# Patient Record
Sex: FEMALE | Race: WHITE | Employment: FULL TIME | URBAN - METROPOLITAN AREA
[De-identification: names, ages, dates, MRNs, and addresses within clinical notes are randomized per-mention and may not be internally consistent; named-entity substitution may affect disease eponyms.]

---

## 2017-07-01 ENCOUNTER — OFFICE VISIT (OUTPATIENT)
Dept: URGENT CARE | Facility: PHYSICIAN GROUP | Age: 22
End: 2017-07-01
Payer: OTHER MISCELLANEOUS

## 2017-07-01 VITALS
BODY MASS INDEX: 23.74 KG/M2 | HEART RATE: 73 BPM | OXYGEN SATURATION: 97 % | DIASTOLIC BLOOD PRESSURE: 60 MMHG | SYSTOLIC BLOOD PRESSURE: 94 MMHG | RESPIRATION RATE: 16 BRPM | TEMPERATURE: 98.8 F | WEIGHT: 134 LBS | HEIGHT: 63 IN

## 2017-07-01 DIAGNOSIS — J98.8 RTI (RESPIRATORY TRACT INFECTION): ICD-10-CM

## 2017-07-01 DIAGNOSIS — J02.0 STREP PHARYNGITIS: Primary | ICD-10-CM

## 2017-07-01 LAB
INT CON NEG: NEGATIVE
INT CON POS: POSITIVE
S PYO AG THROAT QL: NORMAL

## 2017-07-01 PROCEDURE — 99202 OFFICE O/P NEW SF 15 MIN: CPT | Performed by: FAMILY MEDICINE

## 2017-07-01 PROCEDURE — 87880 STREP A ASSAY W/OPTIC: CPT | Performed by: FAMILY MEDICINE

## 2017-07-01 RX ORDER — IBUPROFEN 800 MG/1
800 TABLET ORAL EVERY 8 HOURS PRN
Qty: 20 TAB | Refills: 3 | Status: SHIPPED | OUTPATIENT
Start: 2017-07-01

## 2017-07-01 RX ORDER — AMOXICILLIN 875 MG/1
875 TABLET, COATED ORAL EVERY 12 HOURS
Qty: 20 TAB | Refills: 0 | Status: SHIPPED | OUTPATIENT
Start: 2017-07-01 | End: 2017-07-11

## 2017-07-01 ASSESSMENT — ENCOUNTER SYMPTOMS
COUGH: 1
SPUTUM PRODUCTION: 0
HEADACHES: 1
SORE THROAT: 1
DIZZINESS: 1
CHILLS: 1
FEVER: 1

## 2017-07-01 NOTE — Clinical Note
July 1, 2017         Patient: Navi Reed   YOB: 1995   Date of Visit: 7/1/2017           To Whom it May Concern:    Navi Reed was seen in my clinic on 7/1/2017. She may return to work in 2-3 days.    If you have any questions or concerns, please don't hesitate to call.        Sincerely,           Wagner Crockett M.D.  Electronically Signed

## 2017-07-01 NOTE — PROGRESS NOTES
"Subjective:      Navi Reed is a 22 y.o. female who presents with Dizziness    Chief Complaint   Patient presents with   • Dizziness     cough, sore throat, associated SOB and chest tightness        - This is a very pleasant 22 y.o. female with complaints of 2 days w/ sore throat, subjective fever, malaise aches and now coughing today and feels tight in chest when breathing in and hurts in chest when breathing in. No NV/SOB. No lightheadedness, just feels generally weak.           ALLERGIES:  Review of patient's allergies indicates not on file.     PMH:  No past medical history on file.     MEDS:    Current outpatient prescriptions:   •  amoxicillin (AMOXIL) 875 MG tablet, Take 1 Tab by mouth every 12 hours for 10 days., Disp: 20 Tab, Rfl: 0  •  ibuprofen (MOTRIN) 800 MG Tab, Take 1 Tab by mouth every 8 hours as needed., Disp: 20 Tab, Rfl: 3  •  Amphetamine-Dextroamphetamine (ADDERALL PO), Take  by mouth., Disp: , Rfl:     ** I have documented what I find to be significant in regards to past medical, social, family and surgical history  in my HPI or under PMH/PSH/FH review section, otherwise it is contributory **             HPI    Review of Systems   Constitutional: Positive for fever, chills and malaise/fatigue.   HENT: Positive for sore throat.    Respiratory: Positive for cough. Negative for sputum production.    Neurological: Positive for dizziness and headaches.          Objective:     BP 94/60 mmHg  Pulse 73  Temp(Src) 37.1 °C (98.8 °F)  Resp 16  Ht 1.6 m (5' 3\")  Wt 60.782 kg (134 lb)  BMI 23.74 kg/m2  SpO2 97%     Physical Exam   Constitutional: She appears well-developed. No distress.   HENT:   Head: Normocephalic.   Eyes: Conjunctivae are normal.   Cardiovascular: Regular rhythm.    No murmur heard.  Pulmonary/Chest: Effort normal and breath sounds normal. No respiratory distress.   Neurological: She is alert.   Skin: Skin is warm and dry.   Psychiatric: She has a normal mood and affect. " Judgment normal.   Nursing note and vitals reviewed.  + pharyngeal erythema            Assessment/Plan:         1. Strep pharyngitis  POCT Rapid Strep A    amoxicillin (AMOXIL) 875 MG tablet    ibuprofen (MOTRIN) 800 MG Tab   2. RTI (respiratory tract infection)         Rest hydrate       Dx & d/c instructions discussed w/ patient and/or family members. Follow up w/ Prvt Dr or here in 3-4 days if not getting better, sooner if needed,  ER if worse and UC/PCP unavailable.        Possible side effects (i.e. Rash, GI upset/constipation, sedation, elevation of BP or sugars) of any medications given discussed.

## 2017-07-01 NOTE — MR AVS SNAPSHOT
"        Navi Reed   2017 2:15 PM   Office Visit   MRN: [id number]    Department:  [facility]   Dept Phone:  [phone]    Description:  Female : 1995   Provider:  Wagner Crockett M.D.           Reason for Visit     Dizziness cough, sore throat, associated SOB and chest tightness      Allergies as of 2017     No Known Allergies      You were diagnosed with     Strep pharyngitis   [941316]  -  Primary     RTI (respiratory tract infection)   [[id number]]         Vital Signs     Blood Pressure Pulse Temperature Respirations Height Weight    94/60 mmHg 73 37.1 °C (98.8 °F) 16 1.6 m (5' 3\") 60.782 kg (134 lb)    Body Mass Index Oxygen Saturation Smoking Status             23.74 kg/m2 97% Unknown If Ever Smoked         Basic Information     Date Of Birth Sex Race Ethnicity Preferred Language    1995 Female White, White Unknown English      Health Maintenance        Date Due Completion Dates    IMM HEP B VACCINE (1 of 3 - Primary Series) 1994 ---    IMM HEP A VACCINE (1 of 2 - Standard Series) 1995 ---    IMM HPV VACCINE (1 of 3 - Female 3 Dose Series) 2005 ---    IMM VARICELLA (CHICKENPOX) VACCINE (1 of 2 - 2 Dose Adolescent Series) 2007 ---    IMM DTaP/Tdap/Td Vaccine (1 - Tdap) 2013 ---    PAP SMEAR 2015 ---    IMM INFLUENZA (1) 2017 ---            Current Immunizations     No immunizations on file.      Below and/or attached are the medications your provider expects you to take. Review all of your home medications and newly ordered medications with your provider and/or pharmacist. Follow medication instructions as directed by your provider and/or pharmacist. Please keep your medication list with you and share with your provider. Update the information when medications are discontinued, doses are changed, or new medications (including over-the-counter products) are added; and carry medication information at all times in the event of emergency situations "     Allergies:  No Known Allergies          Medications  Valid as of: July 01, 2017 -  3:12 PM    Generic Name Brand Name Tablet Size Instructions for use    Amoxicillin (Tab) AMOXIL 875 MG Take 1 Tab by mouth every 12 hours for 10 days.        Amphetamine-Dextroamphetamine   Take  by mouth.        Ibuprofen (Tab) MOTRIN 800 MG Take 1 Tab by mouth every 8 hours as needed.        .                 Medicines prescribed today were sent to:     [id number] - [place], [place] - [address]    [address] [place] [address]    Phone: [phone] Fax: [phone]    Open 24 Hours?: No      Medication refill instructions:       If your prescription bottle indicates you have medication refills left, it is not necessary to call your provider’s office. Please contact your pharmacy and they will refill your medication.    If your prescription bottle indicates you do not have any refills left, you may request refills at any time through one of the following ways: The online Fara system (except Urgent Care), by calling your provider’s office, or by asking your pharmacy to contact your provider’s office with a refill request. Medication refills are processed only during regular business hours and may not be available until the next business day. Your provider may request additional information or to have a follow-up visit with you prior to refilling your medication.   *Please Note: Medication refills are assigned a new Rx number when refilled electronically. Your pharmacy may indicate that no refills were authorized even though a new prescription for the same medication is available at the pharmacy. Please request the medicine by name with the pharmacy before contacting your provider for a refill.           Fara Access Code: [email or web address]  Expires: 7/31/2017  2:19 PM    Fara  A secure, online tool to manage your health information     [employer]’s Fara® is a secure, online tool that connects you to  your personalized health information from the privacy of your home -- day or night - making it very easy for you to manage your healthcare. Once the activation process is completed, you can even access your medical information using the Apruve agustina, which is available for free in the Apple Agustina store or Google Play store.     Apruve provides the following levels of access (as shown below):   My Chart Features   [facility] Doctor [facility]  Specialists [facility]  Urgent  Care Non-[facility]  Primary Care  Doctor   Email your healthcare team securely and privately 24/7 X X X    Manage appointments: schedule your next appointment; view details of past/upcoming appointments X      Request prescription refills. X      View recent personal medical records, including lab and immunizations X X X X   View health record, including health history, allergies, medications X X X X   Read reports about your outpatient visits, procedures, consult and ER notes X X X X   See your discharge summary, which is a recap of your hospital and/or ER visit that includes your diagnosis, lab results, and care plan. X X       How to register for Apruve:  1. Go to  [email or web address].  2. Click on the Sign Up Now box, which takes you to the New Member Sign Up page. You will need to provide the following information:  a. Enter your Apruve Access Code exactly as it appears at the top of this page. (You will not need to use this code after you’ve completed the sign-up process. If you do not sign up before the expiration date, you must request a new code.)   b. Enter your date of birth.   c. Enter your home email address.   d. Click Submit, and follow the next screen’s instructions.  3. Create a Apruve ID. This will be your Apruve login ID and cannot be changed, so think of one that is secure and easy to remember.  4. Create a Apruve password. You can change your password at any time.  5. Enter your Password Reset Question and Answer.  This can be used at a later time if you forget your password.   6. Enter your e-mail address. This allows you to receive e-mail notifications when new information is available in [employer].  7. Click Sign Up. You can now view your health information.    For assistance activating your CloSys account, call [phone]

## 2018-07-12 ENCOUNTER — OFFICE VISIT (OUTPATIENT)
Dept: MEDICAL GROUP | Facility: MEDICAL CENTER | Age: 23
End: 2018-07-12
Payer: COMMERCIAL

## 2018-07-12 VITALS
HEART RATE: 68 BPM | RESPIRATION RATE: 14 BRPM | TEMPERATURE: 97.9 F | SYSTOLIC BLOOD PRESSURE: 104 MMHG | OXYGEN SATURATION: 96 % | BODY MASS INDEX: 21.02 KG/M2 | WEIGHT: 118.6 LBS | DIASTOLIC BLOOD PRESSURE: 62 MMHG | HEIGHT: 63 IN

## 2018-07-12 DIAGNOSIS — F98.8 ATTENTION DEFICIT DISORDER (ADD) WITHOUT HYPERACTIVITY: ICD-10-CM

## 2018-07-12 DIAGNOSIS — F41.1 GAD (GENERALIZED ANXIETY DISORDER): ICD-10-CM

## 2018-07-12 PROCEDURE — 99214 OFFICE O/P EST MOD 30 MIN: CPT | Performed by: NURSE PRACTITIONER

## 2018-07-12 RX ORDER — DEXTROAMPHETAMINE SACCHARATE, AMPHETAMINE ASPARTATE MONOHYDRATE, DEXTROAMPHETAMINE SULFATE AND AMPHETAMINE SULFATE 2.5; 2.5; 2.5; 2.5 MG/1; MG/1; MG/1; MG/1
10 CAPSULE, EXTENDED RELEASE ORAL EVERY MORNING
Qty: 30 CAP | Refills: 0 | Status: SHIPPED | OUTPATIENT
Start: 2018-07-12 | End: 2018-08-10 | Stop reason: SDUPTHER

## 2018-07-12 ASSESSMENT — PATIENT HEALTH QUESTIONNAIRE - PHQ9: CLINICAL INTERPRETATION OF PHQ2 SCORE: 0

## 2018-07-12 NOTE — PROGRESS NOTES
CC: establish care, ADD      Navi Reed is a 23 y.o. female here to establish care and to discuss the evaluation and management of:    1. ADD  Patient states that she does have ADD primarily inattentive.  States she has been on medication for this since the age of 14.  States she has been without her medications since November due to transitioning with insurances and providers.  Patient states that she is currently in school, working and has a child and finds it difficult to focus and concentrate.  States she has an appointment with psychiatry in a few weeks to establish.  She does not use any alcohol products, tobacco products or any illicit drugs.  States that she does give herself breaks on the weekend from the medication.      2. Anxiety  Patient states that she does suffer from limited anxiety and the Adderall helps with this as well.       ROS:  Denies any Headache, Blurred Vision, Confusion Chest pain,  Shortness of breath,  Abdominal pain, Changes of bowel or bladder, Lower ext edema, Fevers, Nights sweats, Weight Changes, Focal weakness or numbness.  All other systems are negative. ADD, anxiety      Current Outpatient Prescriptions:   •  amphetamine-dextroamphetamine XR (ADDERALL XR) 10 MG CAPSULE SR 24 HR, Take 1 Cap by mouth every morning for 30 days., Disp: 30 Cap, Rfl: 0  •  ibuprofen (MOTRIN) 800 MG Tab, Take 1 Tab by mouth every 8 hours as needed., Disp: 20 Tab, Rfl: 3    No Known Allergies    Past Medical History:   Diagnosis Date   • ADD (attention deficit disorder)    • Anxiety    • History of depression      History reviewed. No pertinent surgical history.  Family History   Problem Relation Age of Onset   • ADD / ADHD Father    • Heart Attack Maternal Grandfather      Social History     Social History   • Marital status: Single     Spouse name: N/A   • Number of children: N/A   • Years of education: N/A     Occupational History   • Not on file.     Social History Main Topics   • Smoking  "status: Never Smoker   • Smokeless tobacco: Never Used   • Alcohol use No   • Drug use: No   • Sexual activity: Yes     Partners: Male     Other Topics Concern   • Not on file     Social History Narrative   • No narrative on file       Objective:     Vitals: /62   Pulse 68   Temp 36.6 °C (97.9 °F)   Resp 14   Ht 1.6 m (5' 3\")   Wt 53.8 kg (118 lb 9.6 oz)   LMP 06/27/2018   SpO2 96%   BMI 21.01 kg/m²      General: Alert, pleasant, NAD  HEENT:  Normocephalic.   Heart:  Regular rate and rhythm.  S1 and S2 normal.  No murmurs appreciated.  Respiratory:  Normal respiratory effort.  Clear to auscultation bilaterally.    Skin:  Warm, dry, no rashes  Musculoskeletal:  Gait is normal.  Moves all extremities well.  Extremities: . No leg edema.  Neurological: No tremors, sensation grossly intact  Psych:  Affect/mood is normal, judgement is good, memory is intact, grooming is appropriate.    Assessment and Plan.   23 y.o. female to establish care and discuss following    Navi was seen today for establish care and referral needed.    Diagnoses and all orders for this visit:    Attention deficit disorder (ADD) without hyperactivity  Not controlled at this time as patient has been without her medications.  Finding it difficult to complete school tasks and work.  Requesting  a prescription for Adderall at this time while she is waiting to establish with the psychiatry in 2 weeks.  Denies any heart palpitations, chest pain, shortness of breath or heart flutters.Narx check completed.  Last fill was November 2017.  Discussed with patient that I will prescribe her this while she gets established with psychiatry and then they will resume taking over her prescription.    -     amphetamine-dextroamphetamine XR (ADDERALL XR) 10 MG CAPSULE SR 24 HR; Take 1 Cap by mouth every morning for 30 days.    EVIN (generalized anxiety disorder)  Chronic.  Has been controlled with the use of Adderall however has not had Adderall since " November last year.  Establishing with psychiatry in a few weeks.  Try to avoid excessive alcohol, caffeine, stimulants, or energy drinks.      Health Maintenance:     Return in about 4 weeks (around 8/9/2018) for Med Check.        Mayda ORO.

## 2018-08-10 ENCOUNTER — OFFICE VISIT (OUTPATIENT)
Dept: MEDICAL GROUP | Facility: MEDICAL CENTER | Age: 23
End: 2018-08-10
Payer: COMMERCIAL

## 2018-08-10 VITALS
TEMPERATURE: 97.6 F | HEIGHT: 63 IN | OXYGEN SATURATION: 96 % | DIASTOLIC BLOOD PRESSURE: 60 MMHG | SYSTOLIC BLOOD PRESSURE: 106 MMHG | HEART RATE: 65 BPM | WEIGHT: 120 LBS | RESPIRATION RATE: 16 BRPM | BODY MASS INDEX: 21.26 KG/M2

## 2018-08-10 DIAGNOSIS — F98.8 ATTENTION DEFICIT DISORDER (ADD) WITHOUT HYPERACTIVITY: ICD-10-CM

## 2018-08-10 PROCEDURE — 99213 OFFICE O/P EST LOW 20 MIN: CPT | Performed by: NURSE PRACTITIONER

## 2018-08-10 RX ORDER — DEXTROAMPHETAMINE SACCHARATE, AMPHETAMINE ASPARTATE MONOHYDRATE, DEXTROAMPHETAMINE SULFATE AND AMPHETAMINE SULFATE 2.5; 2.5; 2.5; 2.5 MG/1; MG/1; MG/1; MG/1
10 CAPSULE, EXTENDED RELEASE ORAL EVERY MORNING
Qty: 30 CAP | Refills: 0 | Status: SHIPPED | OUTPATIENT
Start: 2018-08-10 | End: 2018-08-10 | Stop reason: SDUPTHER

## 2018-08-10 RX ORDER — DEXTROAMPHETAMINE SACCHARATE, AMPHETAMINE ASPARTATE, DEXTROAMPHETAMINE SULFATE AND AMPHETAMINE SULFATE 1.25; 1.25; 1.25; 1.25 MG/1; MG/1; MG/1; MG/1
5 TABLET ORAL 2 TIMES DAILY
Qty: 60 TAB | Refills: 0 | Status: SHIPPED | OUTPATIENT
Start: 2018-08-10 | End: 2018-10-02 | Stop reason: SDUPTHER

## 2018-08-10 RX ORDER — DEXTROAMPHETAMINE SACCHARATE, AMPHETAMINE ASPARTATE MONOHYDRATE, DEXTROAMPHETAMINE SULFATE AND AMPHETAMINE SULFATE 2.5; 2.5; 2.5; 2.5 MG/1; MG/1; MG/1; MG/1
10 CAPSULE, EXTENDED RELEASE ORAL EVERY MORNING
Qty: 30 CAP | Refills: 0 | Status: SHIPPED | OUTPATIENT
Start: 2018-09-08 | End: 2018-08-10

## 2018-08-10 NOTE — PROGRESS NOTES
cc:  Medication refill      Subjective:     HPI:     Navi Reed is a 23 y.o. female here to discuss the evaluation and management of:    1. Attention deficit disorder (ADD) without hyperactivity  Patient states that she has been doing better since being on her medications far as focusing in school and her homework.  States that she does have appetite suppression, insomnia also her sex drive is down.  Patient states that she unfortunately missed her appointment with psychiatry.  She will need a new re-referral.  States that her last day of classes was today so she will not be taking any of the medication until she starts back up again at the end of the month.      ROS:  Denies any Headache, Blurred Vision, Confusion, Chest pain,  Shortness of breath,  Abdominal pain, Changes of bowel or bladder, Lower ext edema, Fevers, Nights sweats, Weight Changes, Focal weakness or numbness.  All other systems are negative.  Fatigue, insomnia, stress        Current Outpatient Prescriptions:   •  amphetamine-dextroamphetamine (ADDERALL) 5 MG Tab, Take 1 Tab by mouth 2 times a day for 30 days., Disp: 60 Tab, Rfl: 0  •  ibuprofen (MOTRIN) 800 MG Tab, Take 1 Tab by mouth every 8 hours as needed., Disp: 20 Tab, Rfl: 3    No Known Allergies    Past Medical History:   Diagnosis Date   • ADD (attention deficit disorder)    • Anxiety    • History of depression      History reviewed. No pertinent surgical history.  Family History   Problem Relation Age of Onset   • ADD / ADHD Father    • Heart Attack Maternal Grandfather      Social History     Social History   • Marital status: Single     Spouse name: N/A   • Number of children: N/A   • Years of education: N/A     Occupational History   • Not on file.     Social History Main Topics   • Smoking status: Never Smoker   • Smokeless tobacco: Never Used   • Alcohol use No   • Drug use: No   • Sexual activity: Yes     Partners: Male     Other Topics Concern   • Not on file     Social History  "Narrative   • No narrative on file       Objective:     Vitals: /60   Pulse 65   Temp 36.4 °C (97.6 °F)   Resp 16   Ht 1.6 m (5' 3\")   Wt 54.4 kg (120 lb)   LMP 06/27/2018   SpO2 96%   BMI 21.26 kg/m²    General: Alert, pleasant, NAD  HEENT: Normocephalic.    Skin: Warm, dry, no rashes.  Musculoskeletal: Gait is normal.  Moves all extremities well.  Extremities: No leg edema.    Neurological: No tremors, sensation grossly intact  Psych:  Affect/mood is normal, judgement is good, memory is intact, grooming is appropriate.    Assessment/Plan:     Navi was seen today for add.    Diagnoses and all orders for this visit:    Attention deficit disorder (ADD) without hyperactivity  Patient is having decreased appetite, decreased sex drive and difficulty with sleeping.  Will trial the immediate release has the half-life of this will be appropriate for her since she is doing schooling.  Hopefully it will taper off towards the evenings that she will not have so much difficulty sleeping.  Prescription written for take 1 twice a day, patient will start off with taking 1 in the morning to see if this can help her symptoms and if she will need the second dose in the afternoon she may take it.  She will follow back up at the end of September when she has been in school for approximately 1 month. Narx check appropriate last fill was July 12th, 2018  -     REFERRAL TO PSYCHIATRY    -     amphetamine-dextroamphetamine (ADDERALL) 5 MG Tab; Take 1 Tab by mouth 2 times a day for 30 days.       Health care Maintenance:     Return in about 7 weeks (around 9/28/2018) for Med Check.          Mayda BUTT"

## 2018-08-21 DIAGNOSIS — F98.8 ATTENTION DEFICIT DISORDER (ADD) WITHOUT HYPERACTIVITY: ICD-10-CM

## 2018-10-02 ENCOUNTER — OFFICE VISIT (OUTPATIENT)
Dept: MEDICAL GROUP | Facility: MEDICAL CENTER | Age: 23
End: 2018-10-02
Payer: COMMERCIAL

## 2018-10-02 VITALS
BODY MASS INDEX: 22.25 KG/M2 | TEMPERATURE: 98.4 F | WEIGHT: 125.6 LBS | RESPIRATION RATE: 12 BRPM | SYSTOLIC BLOOD PRESSURE: 110 MMHG | DIASTOLIC BLOOD PRESSURE: 68 MMHG | OXYGEN SATURATION: 95 % | HEIGHT: 63 IN | HEART RATE: 58 BPM

## 2018-10-02 DIAGNOSIS — F98.8 ATTENTION DEFICIT DISORDER (ADD) WITHOUT HYPERACTIVITY: ICD-10-CM

## 2018-10-02 DIAGNOSIS — G43.009 MIGRAINE WITHOUT AURA AND WITHOUT STATUS MIGRAINOSUS, NOT INTRACTABLE: ICD-10-CM

## 2018-10-02 PROCEDURE — 99213 OFFICE O/P EST LOW 20 MIN: CPT | Performed by: NURSE PRACTITIONER

## 2018-10-02 RX ORDER — DEXTROAMPHETAMINE SACCHARATE, AMPHETAMINE ASPARTATE, DEXTROAMPHETAMINE SULFATE AND AMPHETAMINE SULFATE 1.25; 1.25; 1.25; 1.25 MG/1; MG/1; MG/1; MG/1
5 TABLET ORAL 2 TIMES DAILY
Qty: 60 TAB | Refills: 0 | Status: SHIPPED | OUTPATIENT
Start: 2018-11-28 | End: 2018-12-28

## 2018-10-02 RX ORDER — DEXTROAMPHETAMINE SACCHARATE, AMPHETAMINE ASPARTATE, DEXTROAMPHETAMINE SULFATE AND AMPHETAMINE SULFATE 1.25; 1.25; 1.25; 1.25 MG/1; MG/1; MG/1; MG/1
5 TABLET ORAL 2 TIMES DAILY
Qty: 60 TAB | Refills: 0 | Status: SHIPPED | OUTPATIENT
Start: 2018-10-02 | End: 2018-10-02 | Stop reason: SDUPTHER

## 2018-10-02 RX ORDER — DEXTROAMPHETAMINE SACCHARATE, AMPHETAMINE ASPARTATE, DEXTROAMPHETAMINE SULFATE AND AMPHETAMINE SULFATE 1.25; 1.25; 1.25; 1.25 MG/1; MG/1; MG/1; MG/1
5 TABLET ORAL 2 TIMES DAILY
Qty: 60 TAB | Refills: 0 | Status: SHIPPED | OUTPATIENT
Start: 2018-10-31 | End: 2018-10-02 | Stop reason: SDUPTHER

## 2018-10-02 NOTE — PROGRESS NOTES
cc: Follow-up attention deficit disorder and medication refill      Subjective:     HPI:     Navi Reed is a 23 y.o. female here to discuss the evaluation and management of:    Attention deficit disorder (ADD) without hyperactivity  Patient states that she has been tolerating the immediate release Adderall much better than the extended release.  States that she is taking 1 in the morning as she does have school on campus Monday through Thursdays and then an online class on Fridays.  She still takes a break on the weekends from the medication.  States that her sleeping is much better and she is not taking any sleep aids.  States that her heart rate has has improved.  States that she is wearing an apple watch that is been recording this for her.  Patient states that her mind does still raise but it is much improved.    Migraines  Patient states that she does have migraines.  She usually gets them when she does not eat.  Sometimes this is difficult because the Adderall X are have a poor appetite.  She does try to stay hydrated.  She does have stress that she is in school and working        ROS:  Denies any Headache, Blurred Vision, Confusion, Chest pain,  Shortness of breath,  Abdominal pain, Changes of bowel or bladder, Lower ext edema, Fevers, Nights sweats, Weight Changes, Focal weakness or numbness.  And all other systems are negative.        Current Outpatient Prescriptions:   •  [START ON 11/28/2018] amphetamine-dextroamphetamine (ADDERALL) 5 MG Tab, Take 1 Tab by mouth 2 times a day for 30 days., Disp: 60 Tab, Rfl: 0  •  ibuprofen (MOTRIN) 800 MG Tab, Take 1 Tab by mouth every 8 hours as needed., Disp: 20 Tab, Rfl: 3    Allergies   Allergen Reactions   • Augmentin [Amoxicillin-Pot Clavulanate]      Dizziness, delusional       Past Medical History:   Diagnosis Date   • ADD (attention deficit disorder)    • Anxiety    • History of depression    • Thyroid disorder     getting work up for ?autoimmune at work  "    No past surgical history on file.  Family History   Problem Relation Age of Onset   • ADD / ADHD Father    • Heart Attack Maternal Grandfather      Social History     Social History   • Marital status: Single     Spouse name: N/A   • Number of children: N/A   • Years of education: N/A     Occupational History   • Not on file.     Social History Main Topics   • Smoking status: Never Smoker   • Smokeless tobacco: Never Used   • Alcohol use No   • Drug use: No   • Sexual activity: Yes     Partners: Male     Other Topics Concern   • Not on file     Social History Narrative   • No narrative on file       Objective:     Vitals: /68 (BP Location: Right arm, Patient Position: Sitting, BP Cuff Size: Adult)   Pulse (!) 58   Temp 36.9 °C (98.4 °F) (Temporal)   Resp 12   Ht 1.6 m (5' 3\")   Wt 57 kg (125 lb 9.6 oz)   SpO2 95%   BMI 22.25 kg/m²    General: Alert, pleasant, NAD  HEENT: Normocephalic.    Skin: Warm, dry, no rashes.  Musculoskeletal: Gait is normal.  Moves all extremities well.  Extremities: No leg edema. No discoloration  Neurological: No tremors, sensation grossly intact, gait is normal  Psych:  Affect/mood is normal, judgement is good, memory is intact, grooming is appropriate.    Assessment/Plan:     Navi was seen today for medication refill and referral needed.    Diagnoses and all orders for this visit:    Attention deficit disorder (ADD) without hyperactivity  Symptoms have improved since starting the short acting Adderall.  Sleeping has improved.  She is still taking 1 in the morning and then if she needs to she will take the second dose in the afternoon.  She will continue to take breaks on the weekends.NARX check completed. Last fill date was 8/20/2018.  She has been off the medications for a little bit of time as she was unable to get a follow-up appointment sooner.  Have given her 3 prescriptions today.  -     : amphetamine-dextroamphetamine (ADDERALL) 5 MG Tab; Take 1 Tab by mouth " 2 times a day for 30 days.  Fill date 10/2/2018  -     Discontinue: amphetamine-dextroamphetamine (ADDERALL) 5 MG Tab; Take 1 Tab by mouth 2 times a day for 30 days.  Fill date 10/31/2018  -     amphetamine-dextroamphetamine (ADDERALL) 5 MG Tab; Take 1 Tab by mouth 2 times a day for 30 days.  Fill date 11/28/2018    Migraine without aura and without status migrainosus, not intractable  Encouraged patient to utilize Tylenol or ibuprofen for headache. Try to keep a headache diary of triggers such as excessive caffeine, lack of sleep, neck and shoulder muscle tension, increased stress, prolonged computer time or poor lighting.    Health care Maintenance:   Pap: Had a Pap yesterday we will obtain records    Return in about 3 months (around 1/2/2019).        Mayda BUTT

## 2018-10-02 NOTE — LETTER
[facility]  ARISTEO LoredoRJOE.  [address]  [place] [address]  Fax: [phone]   Authorization for Release/Disclosure of   Protected Health Information   Name: NAVI DUMONT : 1995 SSN: [id number]   Address: [address] [place] [address] [id number]  [facility] [address] Phone:    [phone] (home)    I authorize the entity listed below to release/disclose the PHI below to:   [facility]/DAQUAN Loredo and DAQUAN Loredo   Provider or Entity Name:  [facility]   Address   City, State, [facility]   Phone:      Fax:     Reason for request: continuity of care   Information to be released:    [  ] LAST COLONOSCOPY,  including any PATH REPORT and follow-up  [  ] LAST FIT/COLOGUARD RESULT [  ] LAST DEXA  [  ] LAST MAMMOGRAM  [x  ] LAST PAP  [  ] LAST LABS [  ] RETINA EXAM REPORT  [  ] IMMUNIZATION RECORDS  [  ] Release all info      [  ] Check here and initial the line next to each item to release ALL health information INCLUDING  _____ Care and treatment for drug and / or alcohol abuse  _____ HIV testing, infection status, or AIDS  _____ Genetic Testing    DATES OF SERVICE OR TIME PERIOD TO BE DISCLOSED: _____________  I understand and acknowledge that:  * This Authorization may be revoked at any time by you in writing, except if your health information has already been used or disclosed.  * Your health information that will be used or disclosed as a result of you signing this authorization could be re-disclosed by the recipient. If this occurs, your re-disclosed health information may no longer be protected by State or Federal laws.  * You may refuse to sign this Authorization. Your refusal will not affect your ability to obtain treatment.  * This Authorization becomes effective upon signing and will  on (date) __________.      If no date is indicated, this Authorization will  one (1) year from the signature date.    Name: Navi  Derek    Signature:   Date:     10/2/2018       PLEASE FAX REQUESTED RECORDS BACK TO: [phone]

## 2018-10-02 NOTE — LETTER
[facility]  SHORTY [place].  [address]  [place] [address]  Fax: [phone]   Authorization for Release/Disclosure of   Protected Health Information   Name: ALONZO DUMONT : 1995 SSN: [id number]   Address: [address] [place] [address] [id number]  [facility] [address] Phone:    [phone] (home)    I authorize the entity listed below to release/disclose the PHI below to:   [facility]/DAQUAN [place] and DAQUAN [place]   Provider or Entity Name:  [facility]   Address   City, State, Zip   Phone:      Fax:     Reason for request: continuity of care   Information to be released:    [  ] LAST COLONOSCOPY,  including any PATH REPORT and follow-up  [  ] LAST FIT/COLOGUARD RESULT [  ] LAST DEXA  [  ] LAST MAMMOGRAM  [  ] LAST PAP  [  ] LAST LABS [  ] RETINA EXAM REPORT  [  ] IMMUNIZATION RECORDS  [ x ] Release all info      [  ] Check here and initial the line next to each item to release ALL health information INCLUDING  _____ Care and treatment for drug and / or alcohol abuse  _____ HIV testing, infection status, or AIDS  _____ Genetic Testing    DATES OF SERVICE OR TIME PERIOD TO BE DISCLOSED: _____________  I understand and acknowledge that:  * This Authorization may be revoked at any time by you in writing, except if your health information has already been used or disclosed.  * Your health information that will be used or disclosed as a result of you signing this authorization could be re-disclosed by the recipient. If this occurs, your re-disclosed health information may no longer be protected by State or Federal laws.  * You may refuse to sign this Authorization. Your refusal will not affect your ability to obtain treatment.  * This Authorization becomes effective upon signing and will  on (date) __________.      If no date is indicated, this Authorization will  one (1) year from the signature date.    Name:  Navi Reed    Signature:   Date:     10/2/2018       PLEASE FAX REQUESTED RECORDS BACK TO: [phone]

## 2018-12-07 ENCOUNTER — APPOINTMENT (OUTPATIENT)
Dept: RADIOLOGY | Facility: MEDICAL CENTER | Age: 23
End: 2018-12-07
Attending: EMERGENCY MEDICINE
Payer: COMMERCIAL

## 2018-12-07 ENCOUNTER — HOSPITAL ENCOUNTER (EMERGENCY)
Facility: MEDICAL CENTER | Age: 23
End: 2018-12-07
Attending: EMERGENCY MEDICINE
Payer: COMMERCIAL

## 2018-12-07 VITALS
OXYGEN SATURATION: 99 % | HEART RATE: 73 BPM | DIASTOLIC BLOOD PRESSURE: 70 MMHG | HEIGHT: 63 IN | BODY MASS INDEX: 22.27 KG/M2 | SYSTOLIC BLOOD PRESSURE: 109 MMHG | RESPIRATION RATE: 16 BRPM | TEMPERATURE: 97.9 F | WEIGHT: 125.66 LBS

## 2018-12-07 DIAGNOSIS — N93.9 VAGINAL BLEEDING: ICD-10-CM

## 2018-12-07 DIAGNOSIS — R51.9 NONINTRACTABLE EPISODIC HEADACHE, UNSPECIFIED HEADACHE TYPE: ICD-10-CM

## 2018-12-07 LAB
ALBUMIN SERPL BCP-MCNC: 4.5 G/DL (ref 3.2–4.9)
ALBUMIN/GLOB SERPL: 1.6 G/DL
ALP SERPL-CCNC: 52 U/L (ref 30–99)
ALT SERPL-CCNC: 13 U/L (ref 2–50)
ANION GAP SERPL CALC-SCNC: 5 MMOL/L (ref 0–11.9)
AST SERPL-CCNC: 13 U/L (ref 12–45)
BASOPHILS # BLD AUTO: 0.6 % (ref 0–1.8)
BASOPHILS # BLD: 0.03 K/UL (ref 0–0.12)
BILIRUB SERPL-MCNC: 0.6 MG/DL (ref 0.1–1.5)
BUN SERPL-MCNC: 11 MG/DL (ref 8–22)
CALCIUM SERPL-MCNC: 10.1 MG/DL (ref 8.5–10.5)
CHLORIDE SERPL-SCNC: 104 MMOL/L (ref 96–112)
CO2 SERPL-SCNC: 28 MMOL/L (ref 20–33)
CREAT SERPL-MCNC: 0.77 MG/DL (ref 0.5–1.4)
EOSINOPHIL # BLD AUTO: 0.05 K/UL (ref 0–0.51)
EOSINOPHIL NFR BLD: 1 % (ref 0–6.9)
ERYTHROCYTE [DISTWIDTH] IN BLOOD BY AUTOMATED COUNT: 45.8 FL (ref 35.9–50)
GLOBULIN SER CALC-MCNC: 2.9 G/DL (ref 1.9–3.5)
GLUCOSE SERPL-MCNC: 91 MG/DL (ref 65–99)
HCG SERPL QL: NEGATIVE
HCT VFR BLD AUTO: 43.3 % (ref 37–47)
HGB BLD-MCNC: 14.5 G/DL (ref 12–16)
IMM GRANULOCYTES # BLD AUTO: 0.01 K/UL (ref 0–0.11)
IMM GRANULOCYTES NFR BLD AUTO: 0.2 % (ref 0–0.9)
LYMPHOCYTES # BLD AUTO: 1.55 K/UL (ref 1–4.8)
LYMPHOCYTES NFR BLD: 30 % (ref 22–41)
MCH RBC QN AUTO: 32.1 PG (ref 27–33)
MCHC RBC AUTO-ENTMCNC: 33.5 G/DL (ref 33.6–35)
MCV RBC AUTO: 95.8 FL (ref 81.4–97.8)
MONOCYTES # BLD AUTO: 0.63 K/UL (ref 0–0.85)
MONOCYTES NFR BLD AUTO: 12.2 % (ref 0–13.4)
NEUTROPHILS # BLD AUTO: 2.9 K/UL (ref 2–7.15)
NEUTROPHILS NFR BLD: 56 % (ref 44–72)
NRBC # BLD AUTO: 0 K/UL
NRBC BLD-RTO: 0 /100 WBC
PLATELET # BLD AUTO: 317 K/UL (ref 164–446)
PMV BLD AUTO: 8.9 FL (ref 9–12.9)
POTASSIUM SERPL-SCNC: 4.1 MMOL/L (ref 3.6–5.5)
PROT SERPL-MCNC: 7.4 G/DL (ref 6–8.2)
RBC # BLD AUTO: 4.52 M/UL (ref 4.2–5.4)
SODIUM SERPL-SCNC: 137 MMOL/L (ref 135–145)
WBC # BLD AUTO: 5.2 K/UL (ref 4.8–10.8)

## 2018-12-07 PROCEDURE — 80053 COMPREHEN METABOLIC PANEL: CPT

## 2018-12-07 PROCEDURE — 700111 HCHG RX REV CODE 636 W/ 250 OVERRIDE (IP): Performed by: EMERGENCY MEDICINE

## 2018-12-07 PROCEDURE — 99285 EMERGENCY DEPT VISIT HI MDM: CPT

## 2018-12-07 PROCEDURE — 84703 CHORIONIC GONADOTROPIN ASSAY: CPT

## 2018-12-07 PROCEDURE — 76856 US EXAM PELVIC COMPLETE: CPT

## 2018-12-07 PROCEDURE — 85025 COMPLETE CBC W/AUTO DIFF WBC: CPT

## 2018-12-07 PROCEDURE — 96374 THER/PROPH/DIAG INJ IV PUSH: CPT

## 2018-12-07 RX ORDER — KETOROLAC TROMETHAMINE 30 MG/ML
15 INJECTION, SOLUTION INTRAMUSCULAR; INTRAVENOUS ONCE
Status: COMPLETED | OUTPATIENT
Start: 2018-12-07 | End: 2018-12-07

## 2018-12-07 RX ADMIN — KETOROLAC TROMETHAMINE 15 MG: 30 INJECTION, SOLUTION INTRAMUSCULAR at 15:42

## 2018-12-07 ASSESSMENT — LIFESTYLE VARIABLES: DO YOU DRINK ALCOHOL: NO

## 2018-12-07 NOTE — ED TRIAGE NOTES
Pt amb to triage.  Chief Complaint   Patient presents with   • Vaginal Bleeding     x1wk, concerned about IUD placement   • Cramping

## 2018-12-07 NOTE — ED PROVIDER NOTES
ED Provider Note    Scribed for Alejandro Cage D.O. by Lino Molina. 12/7/2018  1:54 PM    Primary care provider: DAQUAN [place]  Means of arrival: Walk In  History obtained from: Patient  History limited by: None    CHIEF COMPLAINT  Chief Complaint   Patient presents with   • Vaginal Bleeding     x1wk, concerned about IUD placement   • Cramping       HPI  Navi Reed is a 23 y.o. Female with an IUD placement who presents to the Emergency Department for evaluation of vaginal bleeding onset 1 week ago with associated abdominal cramping. She states she had forceful intercourse last Saturday with her boyfriend, and felt something in her cervix move. Since this event has been bleeding since and reports going through approximately 1 pad an hour for the last 4 days Navi has seen her Gynecologist, Dr. Ignacio, who was not concerned for cervical tearing at this time. She denies any dysuria, nausea, or vomiting    REVIEW OF SYSTEMS  Pertinent positives include vaginal bleeding, cramping. Pertinent negatives include no dysuria, nausea, or vomiting.  All other systems reviewed and negative.    PAST MEDICAL HISTORY  Past Medical History:   Diagnosis Date   • ADD (attention deficit disorder)    • Anxiety    • History of depression    • Thyroid disorder     getting work up for ?autoimmune at work       SURGICAL HISTORY  Patient denies any surgical history.     SOCIAL HISTORY  Social History   Substance Use Topics   • Smoking status: Never Smoker   • Smokeless tobacco: Never Used   • Alcohol use No      History   Drug Use No       FAMILY HISTORY  Family History   Problem Relation Age of Onset   • ADD / ADHD Father    • Heart Attack Maternal Grandfather        CURRENT MEDICATIONS  Home Medications     Reviewed by Gianna Stevenson R.N. (Registered Nurse) on 12/07/18 at 1323  Med List Status: Partial   Medication Last Dose Status   amphetamine-dextroamphetamine (ADDERALL) 5 MG Tab  Active  "  ibuprofen (MOTRIN) 800 MG Tab 8/10/2018 Active                ALLERGIES  Allergies   Allergen Reactions   • Augmentin [Amoxicillin-Pot Clavulanate]      Dizziness, delusional       PHYSICAL EXAM  VITAL SIGNS: /54   Pulse 68   Temp 36.6 °C (97.9 °F) (Temporal)   Resp 14   Ht 1.6 m (5' 3\")   Wt 57 kg (125 lb 10.6 oz)   LMP 12/02/2018   SpO2 99%   BMI 22.26 kg/m²     Nursing notes and vitals reviewed.  Constitutional: Well developed, Well nourished, No acute distress, Non-toxic appearance.   Eyes: PERRLA, EOMI, Conjunctiva normal, No discharge.   Cardiovascular: Normal heart rate, Normal rhythm, No murmurs, No rubs, No gallops.   Thorax & Lungs: No respiratory distress, No rales, No rhonchi, No wheezing, No chest tenderness.   Abdomen: Slight suprapubic tenderness, Bowel sounds normal, Soft, No guarding, No rebound, No masses, No pulsatile masses.   Skin: Warm, Dry, No erythema, No rash.   Musculoskeletal: Intact distal pulses, No edema, No cyanosis, No clubbing. Good range of motion in all major joints. No tenderness to palpation or major deformities noted, no CVA tenderness, no midline back tenderness.   Neurologic: Alert & oriented x 3, Normal motor function, Normal sensory function, No focal deficits noted.  Psychiatric: Affect normal for clinical presentation.    DIAGNOSTIC STUDIES/PROCEDURES    LABS  Results for orders placed or performed during the hospital encounter of 12/07/18   CBC with Differential   Result Value Ref Range    WBC 5.2 4.8 - 10.8 K/uL    RBC 4.52 4.20 - 5.40 M/uL    Hemoglobin 14.5 12.0 - 16.0 g/dL    Hematocrit 43.3 37.0 - 47.0 %    MCV 95.8 81.4 - 97.8 fL    MCH 32.1 27.0 - 33.0 pg    MCHC 33.5 (L) 33.6 - 35.0 g/dL    RDW 45.8 35.9 - 50.0 fL    Platelet Count 317 164 - 446 K/uL    MPV 8.9 (L) 9.0 - 12.9 fL    Neutrophils-Polys 56.00 44.00 - 72.00 %    Lymphocytes 30.00 22.00 - 41.00 %    Monocytes 12.20 0.00 - 13.40 %    Eosinophils 1.00 0.00 - 6.90 %    Basophils 0.60 0.00 - " 1.80 %    Immature Granulocytes 0.20 0.00 - 0.90 %    Nucleated RBC 0.00 /100 WBC    Neutrophils (Absolute) 2.90 2.00 - 7.15 K/uL    Lymphs (Absolute) 1.55 1.00 - 4.80 K/uL    Monos (Absolute) 0.63 0.00 - 0.85 K/uL    Eos (Absolute) 0.05 0.00 - 0.51 K/uL    Baso (Absolute) 0.03 0.00 - 0.12 K/uL    Immature Granulocytes (abs) 0.01 0.00 - 0.11 K/uL    NRBC (Absolute) 0.00 K/uL   Comp Metabolic Panel   Result Value Ref Range    Sodium 137 135 - 145 mmol/L    Potassium 4.1 3.6 - 5.5 mmol/L    Chloride 104 96 - 112 mmol/L    Co2 28 20 - 33 mmol/L    Anion Gap 5.0 0.0 - 11.9    Glucose 91 65 - 99 mg/dL    Bun 11 8 - 22 mg/dL    Creatinine 0.77 0.50 - 1.40 mg/dL    Calcium 10.1 8.5 - 10.5 mg/dL    AST(SGOT) 13 12 - 45 U/L    ALT(SGPT) 13 2 - 50 U/L    Alkaline Phosphatase 52 30 - 99 U/L    Total Bilirubin 0.6 0.1 - 1.5 mg/dL    Albumin 4.5 3.2 - 4.9 g/dL    Total Protein 7.4 6.0 - 8.2 g/dL    Globulin 2.9 1.9 - 3.5 g/dL    A-G Ratio 1.6 g/dL   HCG Qual Serum   Result Value Ref Range    Beta-Hcg Qualitative Serum Negative Negative   ESTIMATED GFR   Result Value Ref Range    GFR If African American >60 >60 mL/min/1.73 m 2    GFR If Non African American >60 >60 mL/min/1.73 m 2       All labs reviewed by me.        RADIOLOGY  US-PELVIC COMPLETE (TRANSABDOMINAL/TRANSVAGINAL) (COMBO)   Final Result      An IUD is in place.   Endometrial stripe measures 6.6 mm in thickness.   4.5 cm right ovarian cyst and 1.0 cm left ovarian cyst. Simple cysts no internal septations or nodularity identified.   No free fluid.        The radiologist's interpretation of all radiological studies have been reviewed by me.    COURSE & MEDICAL DECISION MAKING  Pertinent Labs & Imaging studies reviewed. (See chart for details)    1:54 PM - Patient seen and examined at bedside. Ordered US-Pelivc complete (transabdominal/Transvaginal combo), CBC with differential, CMP, HCG Qual Serum to evaluate her symptoms.     3:25 PM - I performed a pelvic exam at this  time with a female chaperone present.    This is a [facility] 23 y.o. female that presents with vaginal bleeding.  The patient is not pregnancy ectopic pregnancy.  She states that she had forceful sex last week and since since had vaginal bleeding is seen by her gynecologist.  Her bleeding has been steady since her visitation.  On my evaluation, she has an intact IUD, there is no evidence of laceration within the cul-de-sac of the cervix, there is no evidence of cervical motion tenderness, no adnexal tenderness or significant uterine tenderness.  Ultrasound was completed shows no evidence of significant intrapelvic fluid, hematoma, hemorrhage, there is no evidence of torsion.  She does have a 4.5 cm right ovarian cyst and a 1.0 severe left ovarian cyst.  In addition, she has notes of anemia there is no evidence of hemorrhagic cyst as well.  The patient is complaining of a headache therefore she received Toradol.  The patient is to follow-up with her gynecologist next week for further evaluation and possible IUD removal.  Strict return precautions have been given for increasing bleeding, severe pain.      FINAL IMPRESSION  1. Vaginal bleeding Active   2. Nonintractable episodic headache, unspecified headache type Active        DISPOSITION:  Patient will be discharged home in stable condition.    FOLLOW UP:  [facility], Emergency Dept  [address] [facility] [address] [phone]    If symptoms worsen    Parish Ignacio M.D.  [address] [facility] [id number] [phone]    Schedule an appointment as soon as possible for a visit in 3 days       Lino OLEARY (Patrice), am scribing for, and in the presence of, Alejandro Cage D.O    Electronically signed by: Lino Molina (Patrice), 12/7/2018    Alejandro OLEARY D.O. personally performed the services described in this documentation, as scribed by Lino Molina in my presence, and it is both accurate and complete.  C.    The note accurately reflects work and decisions made by me.  Alejandro Cage  12/7/2018  4:02 PM

## 2018-12-07 NOTE — DISCHARGE INSTRUCTIONS
Return to the emergency department if you have severe pain, vaginal bleeding, nausea, vomiting.  Please follow-up with your gynecologist next week for further evaluation.